# Patient Record
Sex: FEMALE | Race: WHITE | HISPANIC OR LATINO | URBAN - METROPOLITAN AREA
[De-identification: names, ages, dates, MRNs, and addresses within clinical notes are randomized per-mention and may not be internally consistent; named-entity substitution may affect disease eponyms.]

---

## 2018-06-27 ENCOUNTER — OFFICE VISIT (OUTPATIENT)
Dept: URBAN - METROPOLITAN AREA CLINIC 48 | Facility: CLINIC | Age: 33
End: 2018-06-27
Payer: COMMERCIAL

## 2018-06-27 DIAGNOSIS — H35.52 RETINITIS PIGMENTOSA: Primary | ICD-10-CM

## 2018-06-27 PROCEDURE — 92134 CPTRZ OPH DX IMG PST SGM RTA: CPT | Performed by: OPHTHALMOLOGY

## 2018-06-27 PROCEDURE — 99213 OFFICE O/P EST LOW 20 MIN: CPT | Performed by: OPHTHALMOLOGY

## 2018-06-27 ASSESSMENT — INTRAOCULAR PRESSURE
OD: 13
OS: 13

## 2018-06-27 NOTE — IMPRESSION/PLAN
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 Discussed with Patient she does have Retinitis pigmentosa and this is the cause of her visual field defect. Discussed with Patient this retinal disease is progressive and uncurable. Patient understands her diagnosis and all questions answered. Discussed there are more than 100 types of defects of RP. Discussed with Patient she can have genetic testing done if she prefers. Discussed with Patient possible causes for RP are Vitamin A deficiency, working with metal and heavy metal toxicity and cancer. I highly doubt this is the cause of RP, I strongly feel genetics are the cause. Discussed with the Patient to have yearly exams and to call if vision worsens. VF 24-2 with Dr. Kiki Arreguin yearly. Patient understands and agrees with plan.

## 2019-02-21 ENCOUNTER — OFFICE VISIT (OUTPATIENT)
Dept: URBAN - METROPOLITAN AREA CLINIC 48 | Facility: CLINIC | Age: 34
End: 2019-02-21
Payer: COMMERCIAL

## 2019-02-21 DIAGNOSIS — H35.50 UNSPECIFIED HEREDITARY RETINAL DYSTROPHY: Primary | ICD-10-CM

## 2019-02-21 PROCEDURE — 92134 CPTRZ OPH DX IMG PST SGM RTA: CPT | Performed by: OPHTHALMOLOGY

## 2019-02-21 PROCEDURE — 99214 OFFICE O/P EST MOD 30 MIN: CPT | Performed by: OPHTHALMOLOGY

## 2019-02-21 ASSESSMENT — INTRAOCULAR PRESSURE
OD: 11
OS: 12

## 2019-02-21 NOTE — IMPRESSION/PLAN
Impression: Unspecified hereditary retinal dystrophy: H35.50. OU. Plan: Pt concerned for progressing peripheral vision loss. Discussed with pt diagnosis and prognosis. Offered referral to see inherited retinal  disease specialist with Dr. Sam Ceballos in [place], [id number] [place] [address] if formal diagnosis with testing preferred. Rec formal examination of pt's children as well. Pt understood and will consider the referral in future. . No CME on exam. Cont yearly exams with Dr. Tricia Coleman for HVF and Mrx.  
F/u 9 mths DFE/OCT

## 2021-04-05 ENCOUNTER — OFFICE VISIT (OUTPATIENT)
Dept: URBAN - METROPOLITAN AREA CLINIC 47 | Facility: CLINIC | Age: 36
End: 2021-04-05
Payer: COMMERCIAL

## 2021-04-05 PROCEDURE — 99204 OFFICE O/P NEW MOD 45 MIN: CPT | Performed by: OPHTHALMOLOGY

## 2021-04-05 PROCEDURE — 92235 FLUORESCEIN ANGRPH MLTIFRAME: CPT | Performed by: OPHTHALMOLOGY

## 2021-04-05 ASSESSMENT — INTRAOCULAR PRESSURE
OD: 14
OS: 14

## 2021-04-05 NOTE — IMPRESSION/PLAN
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 Plan: She complains of nyctalopia and poor peripheral vision OU. OCT shows no IRF/SRF OU and FA shows RPE changes with staining OU. Colors show RPE changes OU. She denies a FMx or PMH or medication use. We discussed the findings and natural history. I recommend she f/u with a medical retina specialist at [facility] for genetic testing. thanks Dr. Latanya Alvarez [facility] PRN